# Patient Record
Sex: FEMALE | Race: WHITE | Employment: FULL TIME | URBAN - METROPOLITAN AREA
[De-identification: names, ages, dates, MRNs, and addresses within clinical notes are randomized per-mention and may not be internally consistent; named-entity substitution may affect disease eponyms.]

---

## 2020-11-22 ENCOUNTER — HOSPITAL ENCOUNTER (EMERGENCY)
Facility: CLINIC | Age: 35
Discharge: HOME | End: 2020-11-22
Attending: EMERGENCY MEDICINE | Admitting: EMERGENCY MEDICINE
Payer: COMMERCIAL

## 2020-11-22 ENCOUNTER — APPOINTMENT (OUTPATIENT)
Dept: GENERAL RADIOLOGY | Facility: CLINIC | Age: 35
End: 2020-11-22
Attending: EMERGENCY MEDICINE
Payer: COMMERCIAL

## 2020-11-22 VITALS
WEIGHT: 210 LBS | HEART RATE: 86 BPM | OXYGEN SATURATION: 95 % | RESPIRATION RATE: 17 BRPM | TEMPERATURE: 98.5 F | DIASTOLIC BLOOD PRESSURE: 75 MMHG | SYSTOLIC BLOOD PRESSURE: 120 MMHG

## 2020-11-22 DIAGNOSIS — Z20.822 SUSPECTED COVID-19 VIRUS INFECTION: ICD-10-CM

## 2020-11-22 DIAGNOSIS — R05.9 COUGH: ICD-10-CM

## 2020-11-22 PROCEDURE — 99283 EMERGENCY DEPT VISIT LOW MDM: CPT | Performed by: EMERGENCY MEDICINE

## 2020-11-22 PROCEDURE — 71045 X-RAY EXAM CHEST 1 VIEW: CPT

## 2020-11-22 PROCEDURE — 99283 EMERGENCY DEPT VISIT LOW MDM: CPT | Mod: 25 | Performed by: EMERGENCY MEDICINE

## 2020-11-22 RX ORDER — SULFASALAZINE 500 MG/1
500 TABLET ORAL 2 TIMES DAILY
COMMUNITY

## 2020-11-22 ASSESSMENT — ENCOUNTER SYMPTOMS
ARTHRALGIAS: 0
COUGH: 1
DIFFICULTY URINATING: 0
SHORTNESS OF BREATH: 1
HEADACHES: 0
CONFUSION: 0
ABDOMINAL PAIN: 0
EYE REDNESS: 0
FEVER: 1
COLOR CHANGE: 0
NECK STIFFNESS: 0

## 2020-11-22 NOTE — ED PROVIDER NOTES
ED Provider Note  [facility] EMERGENCY DEPARTMENT ([facility])    11/22/20      History     Chief Complaint   Patient presents with     Shortness of Breath     Started yesterday morning,increasing shortness of breath     Cough     dry cough x 8 days     Fever     T max- 103 last tues-weds, patient has been taking tylenol to manage fever     HPI  Mallory Helms is a 34 year old female with no significant medical history who presents to the Emergency Department for evaluation of shortness of breath and dry cough.  Patient states that she has been ill for the past 8 days.  For the past day, she has felt worse.  She feels increased shortness of breath.  Her cough is been nonproductive.  She denies any chest pain.  She denies any abdominal pain.  No nausea or vomiting.  She denies chance of pregnancy.  Patient has family members that have Covid currently.  She has had test that is currently pending.  Patient denies any leg pain or swelling.  No hemoptysis.  No pleuritic chest pain.    Past Medical History  History reviewed. No pertinent past medical history.  Past Surgical History:   Procedure Laterality Date     ENT SURGERY      tonsilectomy          sulfaSALAzine (AZULFIDINE) 500 MG tablet      No Known Allergies  Family History  History reviewed. No pertinent family history.  Social History   Social History     Tobacco Use     Smoking status: Never Smoker     Smokeless tobacco: Never Used   Substance Use Topics     Alcohol use: Yes     Comment: socially     Drug use: Not Currently      Past medical history, past surgical history, medications, allergies, family history, and social history were reviewed with the patient. No additional pertinent items.       Review of Systems   Constitutional: Positive for fever.   HENT: Negative for congestion.    Eyes: Negative for redness.   Respiratory: Positive for cough and shortness of breath.    Cardiovascular: Negative for  chest pain.   Gastrointestinal: Negative for abdominal pain.   Genitourinary: Negative for difficulty urinating.   Musculoskeletal: Negative for arthralgias and neck stiffness.   Skin: Negative for color change.   Neurological: Negative for headaches.   Psychiatric/Behavioral: Negative for confusion.   All other systems reviewed and are negative.    A complete review of systems was performed with pertinent positives and negatives noted in the HPI, and all other systems negative.    Physical Exam   BP: 129/83  Pulse: 106  Temp: 98.5  F (36.9  C)  Resp: 16  Weight: 95.3 kg (210 lb)(bedscale)  SpO2: 95 %  Physical Exam  Vitals signs and nursing note reviewed.   Constitutional:       General: She is not in acute distress.     Appearance: She is well-developed. She is not diaphoretic.   HENT:      Head: Normocephalic and atraumatic.      Mouth/Throat:      Pharynx: No oropharyngeal exudate.   Eyes:      General: No scleral icterus.     Pupils: Pupils are equal, round, and reactive to light.   Cardiovascular:      Heart sounds: Normal heart sounds.   Pulmonary:      Effort: Pulmonary effort is normal. No respiratory distress.      Breath sounds: Normal breath sounds.   Abdominal:      General: Bowel sounds are normal.      Palpations: Abdomen is soft.      Tenderness: There is no abdominal tenderness.   Musculoskeletal: Normal range of motion.         General: No tenderness.   Skin:     General: Skin is warm.      Findings: No rash.   Neurological:      Mental Status: She is alert.         ED Course      Procedures        Results for orders placed or performed during the hospital encounter of 11/22/20   XR Chest Port 1 View     Status: None    Narrative    XR CHEST PORT 1 VW 11/22/2020 11:36 AM    HISTORY: cough    COMPARISON: None.      Impression    IMPRESSION: Faint ill-defined opacities in the lower lungs could be  due to atelectasis or pneumonitis. The lungs are otherwise clear. No  pleural effusion or pneumothorax.  The cardiac silhouette and pulmonary  vasculature are within normal limits.    JLUIS ARRIAGA MD         Results for orders placed or performed during the hospital encounter of 11/22/20   XR Chest Port 1 View     Status: None    Narrative    XR CHEST PORT 1 VW 11/22/2020 11:36 AM    HISTORY: cough    COMPARISON: None.      Impression    IMPRESSION: Faint ill-defined opacities in the lower lungs could be  due to atelectasis or pneumonitis. The lungs are otherwise clear. No  pleural effusion or pneumothorax. The cardiac silhouette and pulmonary  vasculature are within normal limits.    JLUIS ARRIAGA MD     Medications - No data to display     Assessments & Plan (with Medical Decision Making)   34 year old female to the emergency department with worsening cough and dyspnea.  Patient has several family members that are positive for Covid currently.  Her test is pending.  She likely has Covid based on her presentation.  She has normal oxygen saturations and clear lungs.  Her chest radiograph is largely unremarkable although there is some question of ill-defined opacities in the lower lungs.  She does not have a lobar consolidation.  She is not wheezing.  Therefore, I do not feel that antibiotics or steroids are indicated.  She is not hypoxic.  She likely has Covid.  She has been self quarantining and will need to continue to do so.  She will be discharged home.  I asked her to return if worsening symptoms or symptoms including hemoptysis, pleuritic chest pain, chest pain, leg swelling, or other concerns.    I have reviewed the nursing notes. I have reviewed the findings, diagnosis, plan and need for follow up with the patient.    Discharge Medication List as of 11/22/2020 12:35 PM          Final diagnoses:   Cough   Suspected COVID-19 virus infection     Chart documentation was completed with Dragon voice-recognition software. Even though reviewed, this chart may still contain some grammatical, spelling, and word  errors.     --  Ignacio Arnold Md  [facility] EMERGENCY DEPARTMENT  11/22/2020     Ignacio Arnold MD  11/22/20 [id number]

## 2020-11-22 NOTE — ED TRIAGE NOTES
Patient has 4 of her family tested positive for covid. Patient was tested yesterday, waiting for result.

## 2020-11-22 NOTE — ED AVS SNAPSHOT
[facility] Emergency Department  [address]  [place] [address]  Phone: [phone]  Fax: [phone]                                    Mallory Helms   MRN: [id number]    Department: [facility] Emergency Department   Date of Visit: 11/22/2020           After Visit Summary Signature Page    I have received my discharge instructions, and my questions have been answered. I have discussed any challenges I see with this plan with the nurse or doctor.    ..........................................................................................................................................  Patient/Patient Representative Signature      ..........................................................................................................................................  Patient Representative Print Name and Relationship to Patient    ..................................................               ................................................  Date                                   Time    ..........................................................................................................................................  Reviewed by Signature/Title    ...................................................              ..............................................  Date                                               Time          22EPIC Rev 08/18

## 2020-11-22 NOTE — DISCHARGE INSTRUCTIONS
Return to the emergency department if your symptoms worsening including worsening breathing, coughing up blood, chest pain, leg pain or swelling, or other concerns.